# Patient Record
Sex: FEMALE | Race: WHITE | Employment: FULL TIME | URBAN - METROPOLITAN AREA
[De-identification: names, ages, dates, MRNs, and addresses within clinical notes are randomized per-mention and may not be internally consistent; named-entity substitution may affect disease eponyms.]

---

## 2017-04-28 ENCOUNTER — OFFICE VISIT (OUTPATIENT)
Dept: FAMILY MEDICINE CLINIC | Facility: CLINIC | Age: 42
End: 2017-04-28

## 2017-04-28 VITALS
DIASTOLIC BLOOD PRESSURE: 76 MMHG | HEART RATE: 67 BPM | TEMPERATURE: 98 F | BODY MASS INDEX: 35.72 KG/M2 | WEIGHT: 217 LBS | HEIGHT: 65.5 IN | SYSTOLIC BLOOD PRESSURE: 100 MMHG | OXYGEN SATURATION: 98 %

## 2017-04-28 DIAGNOSIS — M25.561 CHRONIC PAIN OF RIGHT KNEE: ICD-10-CM

## 2017-04-28 DIAGNOSIS — M21.6X1 PRONATION DEFORMITY OF RIGHT FOOT: ICD-10-CM

## 2017-04-28 DIAGNOSIS — M79.604 RIGHT LEG PAIN: ICD-10-CM

## 2017-04-28 DIAGNOSIS — M79.671 RIGHT FOOT PAIN: ICD-10-CM

## 2017-04-28 DIAGNOSIS — M25.571 CHRONIC PAIN OF RIGHT ANKLE: ICD-10-CM

## 2017-04-28 DIAGNOSIS — M25.551 RIGHT HIP PAIN: Primary | ICD-10-CM

## 2017-04-28 DIAGNOSIS — G89.29 CHRONIC PAIN OF RIGHT ANKLE: ICD-10-CM

## 2017-04-28 DIAGNOSIS — G89.29 CHRONIC PAIN OF RIGHT KNEE: ICD-10-CM

## 2017-04-28 PROCEDURE — 80050 GENERAL HEALTH PANEL: CPT | Performed by: FAMILY MEDICINE

## 2017-04-28 PROCEDURE — 85652 RBC SED RATE AUTOMATED: CPT | Performed by: FAMILY MEDICINE

## 2017-04-28 PROCEDURE — 36415 COLL VENOUS BLD VENIPUNCTURE: CPT | Performed by: FAMILY MEDICINE

## 2017-04-28 PROCEDURE — 86200 CCP ANTIBODY: CPT | Performed by: FAMILY MEDICINE

## 2017-04-28 PROCEDURE — 86140 C-REACTIVE PROTEIN: CPT | Performed by: FAMILY MEDICINE

## 2017-04-28 PROCEDURE — 99214 OFFICE O/P EST MOD 30 MIN: CPT | Performed by: FAMILY MEDICINE

## 2017-04-28 PROCEDURE — 86038 ANTINUCLEAR ANTIBODIES: CPT | Performed by: FAMILY MEDICINE

## 2017-05-01 ENCOUNTER — TELEPHONE (OUTPATIENT)
Dept: FAMILY MEDICINE CLINIC | Facility: CLINIC | Age: 42
End: 2017-05-01

## 2017-05-01 NOTE — TELEPHONE ENCOUNTER
Notes Recorded by Dion Smith MD on 4/30/2017 at 10:56 PM  Please notify pt her labs look great, including normal blood counts, blood sugar, kidneys, liver, electrolytes, thyroid, and negative autoimmune screen (rheumatoid arthritis, sjogrens, etc).  I'l

## 2017-05-10 ENCOUNTER — TELEPHONE (OUTPATIENT)
Dept: FAMILY MEDICINE CLINIC | Facility: CLINIC | Age: 42
End: 2017-05-10

## 2017-05-10 NOTE — TELEPHONE ENCOUNTER
Called the pt and advised that the MRI has been denied and that Dr. Peña Dealowell is recommending that she see podiatry- the pt v/u and states that she will go see Dr. Anderson Dee.   Actually the pt was contemplating if the MRI was worth the money so she tells me that

## 2017-05-10 NOTE — TELEPHONE ENCOUNTER
MRI of the right ankle has been denied since she has not had specific doctor directed  Therapy in the last 3 months- In addition the insurance requires plain xrays, so Dr. Cathern Hatchet is recommending that you see a podiatrist at this time to help guide treatment

## 2017-07-11 ENCOUNTER — TELEPHONE (OUTPATIENT)
Dept: FAMILY MEDICINE CLINIC | Facility: CLINIC | Age: 42
End: 2017-07-11

## 2017-07-11 NOTE — TELEPHONE ENCOUNTER
Called the pt and advised that Dr. Saran Goss can do the px with the preop in August- she is agreeable and appt for 7/12/17 cancelled  Future Appointments  Date Time Provider Pricila Steele   8/7/2017 8:20 AM Dion Smith MD [facility] JENNA Crowell

## 2017-07-11 NOTE — TELEPHONE ENCOUNTER
Pt is unsure as to if she should keep her px now that we just set up a Pre Op in AUG. Please call pt back.

## 2017-08-07 ENCOUNTER — OFFICE VISIT (OUTPATIENT)
Dept: FAMILY MEDICINE CLINIC | Facility: CLINIC | Age: 42
End: 2017-08-07

## 2017-08-07 VITALS
HEART RATE: 66 BPM | TEMPERATURE: 97 F | BODY MASS INDEX: 35 KG/M2 | SYSTOLIC BLOOD PRESSURE: 104 MMHG | DIASTOLIC BLOOD PRESSURE: 72 MMHG | WEIGHT: 212.38 LBS | RESPIRATION RATE: 14 BRPM

## 2017-08-07 DIAGNOSIS — Z13.0 SCREENING, ANEMIA, DEFICIENCY, IRON: ICD-10-CM

## 2017-08-07 DIAGNOSIS — N84.1 CERVICAL POLYP: ICD-10-CM

## 2017-08-07 DIAGNOSIS — Z13.21 ENCOUNTER FOR VITAMIN DEFICIENCY SCREENING: ICD-10-CM

## 2017-08-07 DIAGNOSIS — Z13.1 DIABETES MELLITUS SCREENING: ICD-10-CM

## 2017-08-07 DIAGNOSIS — Z12.39 SCREENING BREAST EXAMINATION: ICD-10-CM

## 2017-08-07 DIAGNOSIS — Z12.4 CERVICAL CANCER SCREENING: ICD-10-CM

## 2017-08-07 DIAGNOSIS — Z12.31 ENCOUNTER FOR SCREENING MAMMOGRAM FOR BREAST CANCER: ICD-10-CM

## 2017-08-07 DIAGNOSIS — Z01.818 PRE-OP EVALUATION: ICD-10-CM

## 2017-08-07 DIAGNOSIS — Z00.00 ROUTINE HISTORY AND PHYSICAL EXAMINATION OF ADULT: Primary | ICD-10-CM

## 2017-08-07 DIAGNOSIS — M77.8 CAPSULITIS OF FOOT, RIGHT: ICD-10-CM

## 2017-08-07 DIAGNOSIS — Z13.29 THYROID DISORDER SCREEN: ICD-10-CM

## 2017-08-07 DIAGNOSIS — Z13.220 LIPID SCREENING: ICD-10-CM

## 2017-08-07 DIAGNOSIS — M89.8X7 EXOSTOSIS OF BONE OF FOOT: ICD-10-CM

## 2017-08-07 DIAGNOSIS — L50.3 DERMATOGRAPHISM: ICD-10-CM

## 2017-08-07 LAB
25-HYDROXYVITAMIN D (TOTAL): 34.6 NG/ML (ref 30–100)
ALBUMIN SERPL-MCNC: 4 G/DL (ref 3.5–4.8)
ALP LIVER SERPL-CCNC: 78 U/L (ref 37–98)
ALT SERPL-CCNC: 31 U/L (ref 14–54)
APTT PPP: 29.6 SECONDS (ref 25–34)
AST SERPL-CCNC: 19 U/L (ref 15–41)
BASOPHILS # BLD AUTO: 0.06 X10(3) UL (ref 0–0.1)
BASOPHILS NFR BLD AUTO: 1 %
BILIRUB SERPL-MCNC: 0.4 MG/DL (ref 0.1–2)
BUN BLD-MCNC: 10 MG/DL (ref 8–20)
CALCIUM BLD-MCNC: 9.3 MG/DL (ref 8.3–10.3)
CHLORIDE: 108 MMOL/L (ref 101–111)
CHOLEST SMN-MCNC: 146 MG/DL (ref ?–200)
CO2: 26 MMOL/L (ref 22–32)
CREAT BLD-MCNC: 0.75 MG/DL (ref 0.55–1.02)
EOSINOPHIL # BLD AUTO: 0.18 X10(3) UL (ref 0–0.3)
EOSINOPHIL NFR BLD AUTO: 2.9 %
ERYTHROCYTE [DISTWIDTH] IN BLOOD BY AUTOMATED COUNT: 13.3 % (ref 11.5–16)
EST. AVERAGE GLUCOSE BLD GHB EST-MCNC: 111 MG/DL (ref 68–126)
GLUCOSE BLD-MCNC: 92 MG/DL (ref 70–99)
HBA1C MFR BLD HPLC: 5.5 % (ref ?–5.7)
HCT VFR BLD AUTO: 42.1 % (ref 34–50)
HDLC SERPL-MCNC: 41 MG/DL (ref 45–?)
HDLC SERPL: 3.56 {RATIO} (ref ?–4.44)
HGB BLD-MCNC: 13.6 G/DL (ref 12–16)
IMMATURE GRANULOCYTE COUNT: 0.01 X10(3) UL (ref 0–1)
IMMATURE GRANULOCYTE RATIO %: 0.2 %
INR BLD: 1.04 (ref 0.89–1.11)
LDLC SERPL CALC-MCNC: 90 MG/DL (ref ?–130)
LDLC SERPL-MCNC: 15 MG/DL (ref 5–40)
LYMPHOCYTES # BLD AUTO: 2 X10(3) UL (ref 0.9–4)
LYMPHOCYTES NFR BLD AUTO: 31.9 %
M PROTEIN MFR SERPL ELPH: 7.9 G/DL (ref 6.1–8.3)
MCH RBC QN AUTO: 29 PG (ref 27–33.2)
MCHC RBC AUTO-ENTMCNC: 32.3 G/DL (ref 31–37)
MCV RBC AUTO: 89.8 FL (ref 81–100)
MONOCYTES # BLD AUTO: 0.68 X10(3) UL (ref 0.1–0.6)
MONOCYTES NFR BLD AUTO: 10.9 %
NEUTROPHIL ABS PRELIM: 3.33 X10 (3) UL (ref 1.3–6.7)
NEUTROPHILS # BLD AUTO: 3.33 X10(3) UL (ref 1.3–6.7)
NEUTROPHILS NFR BLD AUTO: 53.1 %
NONHDLC SERPL-MCNC: 105 MG/DL (ref ?–130)
PLATELET # BLD AUTO: 253 10(3)UL (ref 150–450)
POTASSIUM SERPL-SCNC: 3.7 MMOL/L (ref 3.6–5.1)
PSA SERPL DL<=0.01 NG/ML-MCNC: 13.6 SECONDS (ref 12–14.3)
RBC # BLD AUTO: 4.69 X10(6)UL (ref 3.8–5.1)
RED CELL DISTRIBUTION WIDTH-SD: 43.8 FL (ref 35.1–46.3)
SODIUM SERPL-SCNC: 139 MMOL/L (ref 136–144)
TRIGLYCERIDES: 73 MG/DL (ref ?–150)
TSI SER-ACNC: 0.47 MIU/ML (ref 0.35–5.5)
WBC # BLD AUTO: 6.3 X10(3) UL (ref 4–13)

## 2017-08-07 PROCEDURE — 85730 THROMBOPLASTIN TIME PARTIAL: CPT | Performed by: FAMILY MEDICINE

## 2017-08-07 PROCEDURE — 83036 HEMOGLOBIN GLYCOSYLATED A1C: CPT | Performed by: FAMILY MEDICINE

## 2017-08-07 PROCEDURE — 80050 GENERAL HEALTH PANEL: CPT | Performed by: FAMILY MEDICINE

## 2017-08-07 PROCEDURE — 99396 PREV VISIT EST AGE 40-64: CPT | Performed by: FAMILY MEDICINE

## 2017-08-07 PROCEDURE — 82306 VITAMIN D 25 HYDROXY: CPT | Performed by: FAMILY MEDICINE

## 2017-08-07 PROCEDURE — 85610 PROTHROMBIN TIME: CPT | Performed by: FAMILY MEDICINE

## 2017-08-07 PROCEDURE — 88175 CYTOPATH C/V AUTO FLUID REDO: CPT | Performed by: FAMILY MEDICINE

## 2017-08-07 PROCEDURE — 36415 COLL VENOUS BLD VENIPUNCTURE: CPT | Performed by: FAMILY MEDICINE

## 2017-08-07 PROCEDURE — 80061 LIPID PANEL: CPT | Performed by: FAMILY MEDICINE

## 2017-08-07 NOTE — PROGRESS NOTES
HPI:   Agustin Looney is a 35year old female who presents for a complete physical exam.  Patient complains of nothing major new for me, has upcoming surgery needs pre-op today (see pre-op).  She does think her muscle fatigue and are sore more quickly th Smokeless tobacco: Never Used                      Alcohol use: No              Occ:  (works from home). : yes. Children: 2.    Exercise: none now  Diet: weight watchers     REVIEW OF SYSTEMS:   GENERAL: feels well in general, are grossly intact    ASSESSMENT AND PLAN:   Generally well appearing female, congratulated on healthy lifestyle changes, keep it up  For disease prevention (dementia, cancer, diabetes, heart disease, depression, anxiety) shoot for 150minutes/week of moder

## 2017-08-07 NOTE — H&P
Preethi Harp is a 35year old female who presents for a pre-operative physical exam. Patient is to have repair of posterior tibial tendon R foot and resection exostosis medfoot right, to be done by Dr. Gena Burdick at [facility] on 8/25/17.       HPI:   Pt °C) (Temporal)   Resp 14   Wt 212 lb 6.4 oz   LMP 07/20/2017   BMI 34.81 kg/m²   GENERAL: well developed, well nourished,in no apparent distress  SKIN: no rashes,no suspicious lesions  HEENT: atraumatic, normocephalic,ear canals clear, normal TMs, nares pa

## 2017-08-08 ENCOUNTER — TELEPHONE (OUTPATIENT)
Dept: FAMILY MEDICINE CLINIC | Facility: CLINIC | Age: 42
End: 2017-08-08

## 2017-08-08 LAB
HPV I/H RISK 1 DNA SPEC QL NAA+PROBE: NEGATIVE
LAST PAP RESULT: NORMAL
PAP HISTORY (OTHER THAN LAST PAP): NORMAL

## 2017-08-08 NOTE — TELEPHONE ENCOUNTER
Spoke with the pt and advised of the lab results and recommendations- she v/u  Advised that I faxed the biometric screening   Faxed the labs and H&P to Dr. Norman Land office

## 2017-08-08 NOTE — TELEPHONE ENCOUNTER
----- Message from Adams Pena MD sent at 8/7/2017 10:51 PM CDT -----  Please notify pt her labs look pretty perfect, including normal labs for surgery (clotting times, blood counts, kidney function, liver function, electrolytes), normal blood sugar, nor

## 2017-08-10 ENCOUNTER — TELEPHONE (OUTPATIENT)
Dept: FAMILY MEDICINE CLINIC | Facility: CLINIC | Age: 42
End: 2017-08-10

## 2017-08-10 NOTE — TELEPHONE ENCOUNTER
----- Message from Tuyet Morton MD sent at 8/8/2017 11:51 PM CDT -----  Please notify pt of good news--her HPV test is negative and her PAP just shows \"reactive squamous cells\"--a fancy phrase for some non-specific inflammation (meaning the cells aren't

## 2017-08-11 ENCOUNTER — MED REC SCAN ONLY (OUTPATIENT)
Dept: FAMILY MEDICINE CLINIC | Facility: CLINIC | Age: 42
End: 2017-08-11

## 2017-08-14 NOTE — TELEPHONE ENCOUNTER
Spoke with the pt and advised of the information from the pap and recommended follow up.   She v/u  She asked if she needs to see the Gyne prior to her surgery-I checked with Dr. Don Humphries and she states that the pt does not need to see Gyne prior to her surgery

## 2017-10-26 ENCOUNTER — TELEPHONE (OUTPATIENT)
Dept: OBGYN CLINIC | Facility: CLINIC | Age: 42
End: 2017-10-26

## 2017-10-26 NOTE — TELEPHONE ENCOUNTER
She will be a new pt referred by Dr. Ja Briscoe. Please review her pap result and let psr know what type of visit that she needs.   Thanks

## 2017-10-27 ENCOUNTER — HOSPITAL ENCOUNTER (OUTPATIENT)
Dept: MAMMOGRAPHY | Age: 42
Discharge: HOME | End: 2017-10-27
Attending: FAMILY MEDICINE
Payer: COMMERCIAL

## 2017-10-27 DIAGNOSIS — Z00.00 ROUTINE HISTORY AND PHYSICAL EXAMINATION OF ADULT: ICD-10-CM

## 2017-10-27 DIAGNOSIS — Z12.31 ENCOUNTER FOR SCREENING MAMMOGRAM FOR BREAST CANCER: ICD-10-CM

## 2017-10-27 PROCEDURE — 77067 SCR MAMMO BI INCL CAD: CPT | Performed by: FAMILY MEDICINE

## 2017-10-30 NOTE — TELEPHONE ENCOUNTER
Her pap is normal  However, the primary care documents a cervical polyp--this should be removed, can usually be done in the office. Ok for new pt appt, when available.

## 2017-10-30 NOTE — TELEPHONE ENCOUNTER
Scheduled PT problem gyne exam in December with Dr. Aurelia Kan in [facility]

## 2017-12-06 ENCOUNTER — OFFICE VISIT (OUTPATIENT)
Dept: OBGYN CLINIC | Facility: CLINIC | Age: 42
End: 2017-12-06

## 2017-12-06 VITALS
SYSTOLIC BLOOD PRESSURE: 110 MMHG | DIASTOLIC BLOOD PRESSURE: 78 MMHG | HEIGHT: 65.75 IN | WEIGHT: 212 LBS | BODY MASS INDEX: 34.48 KG/M2

## 2017-12-06 DIAGNOSIS — N84.1 MUCOUS POLYP OF CERVIX: Primary | ICD-10-CM

## 2017-12-06 PROCEDURE — 57500 BIOPSY OF CERVIX: CPT | Performed by: OBSTETRICS & GYNECOLOGY

## 2017-12-06 PROCEDURE — 88305 TISSUE EXAM BY PATHOLOGIST: CPT | Performed by: OBSTETRICS & GYNECOLOGY

## 2017-12-06 NOTE — PATIENT INSTRUCTIONS
[facility] Department of OB/GYN  After Care Instructions for Colposcopy/Biopsy      Biopsy Results   You will receive a phone call with your biopsy results in 7 business days. If you have not received your biopsy results in 7 days, please contact our office.   Elvin Medina

## 2017-12-06 NOTE — PROGRESS NOTES
Here with cervical polyp    Betadine prep  Polyp removed  Hemostasis intact  Sent to path    alondra well  ebl min

## 2018-08-08 ENCOUNTER — TELEPHONE (OUTPATIENT)
Dept: FAMILY MEDICINE CLINIC | Facility: CLINIC | Age: 43
End: 2018-08-08

## 2018-12-07 ENCOUNTER — HOSPITAL ENCOUNTER (OUTPATIENT)
Dept: MAMMOGRAPHY | Age: 43
Discharge: HOME | End: 2018-12-07
Attending: FAMILY MEDICINE
Payer: COMMERCIAL

## 2018-12-07 ENCOUNTER — HOSPITAL ENCOUNTER (OUTPATIENT)
Dept: GENERAL RADIOLOGY | Age: 43
Discharge: HOME | End: 2018-12-07
Attending: FAMILY MEDICINE
Payer: COMMERCIAL

## 2018-12-07 ENCOUNTER — OFFICE VISIT (OUTPATIENT)
Dept: FAMILY MEDICINE CLINIC | Facility: CLINIC | Age: 43
End: 2018-12-07
Payer: COMMERCIAL

## 2018-12-07 VITALS
RESPIRATION RATE: 14 BRPM | TEMPERATURE: 98 F | HEART RATE: 80 BPM | DIASTOLIC BLOOD PRESSURE: 72 MMHG | HEIGHT: 66.25 IN | SYSTOLIC BLOOD PRESSURE: 112 MMHG | WEIGHT: 225 LBS | BODY MASS INDEX: 36.16 KG/M2

## 2018-12-07 DIAGNOSIS — E55.9 VITAMIN D DEFICIENCY DISEASE: ICD-10-CM

## 2018-12-07 DIAGNOSIS — Z13.21 ENCOUNTER FOR VITAMIN DEFICIENCY SCREENING: ICD-10-CM

## 2018-12-07 DIAGNOSIS — Z13.29 THYROID DISORDER SCREEN: ICD-10-CM

## 2018-12-07 DIAGNOSIS — Z00.00 ROUTINE HISTORY AND PHYSICAL EXAMINATION OF ADULT: Primary | ICD-10-CM

## 2018-12-07 DIAGNOSIS — Z13.1 DIABETES MELLITUS SCREENING: ICD-10-CM

## 2018-12-07 DIAGNOSIS — Z12.4 CERVICAL CANCER SCREENING: ICD-10-CM

## 2018-12-07 DIAGNOSIS — M53.3 COCCYDYNIA: ICD-10-CM

## 2018-12-07 DIAGNOSIS — Z00.00 ROUTINE HISTORY AND PHYSICAL EXAMINATION OF ADULT: ICD-10-CM

## 2018-12-07 DIAGNOSIS — Z13.220 LIPID SCREENING: ICD-10-CM

## 2018-12-07 DIAGNOSIS — Z12.39 SCREENING BREAST EXAMINATION: ICD-10-CM

## 2018-12-07 DIAGNOSIS — Z12.31 SCREENING MAMMOGRAM, ENCOUNTER FOR: ICD-10-CM

## 2018-12-07 DIAGNOSIS — R15.2 FECAL URGENCY: ICD-10-CM

## 2018-12-07 DIAGNOSIS — Z13.0 SCREENING, ANEMIA, DEFICIENCY, IRON: ICD-10-CM

## 2018-12-07 PROCEDURE — 87624 HPV HI-RISK TYP POOLED RSLT: CPT | Performed by: FAMILY MEDICINE

## 2018-12-07 PROCEDURE — 80061 LIPID PANEL: CPT | Performed by: FAMILY MEDICINE

## 2018-12-07 PROCEDURE — 82306 VITAMIN D 25 HYDROXY: CPT | Performed by: FAMILY MEDICINE

## 2018-12-07 PROCEDURE — 99396 PREV VISIT EST AGE 40-64: CPT | Performed by: FAMILY MEDICINE

## 2018-12-07 PROCEDURE — 72220 X-RAY EXAM SACRUM TAILBONE: CPT | Performed by: FAMILY MEDICINE

## 2018-12-07 PROCEDURE — 36415 COLL VENOUS BLD VENIPUNCTURE: CPT | Performed by: FAMILY MEDICINE

## 2018-12-07 PROCEDURE — 80050 GENERAL HEALTH PANEL: CPT | Performed by: FAMILY MEDICINE

## 2018-12-07 PROCEDURE — 77067 SCR MAMMO BI INCL CAD: CPT | Performed by: FAMILY MEDICINE

## 2018-12-07 PROCEDURE — 83036 HEMOGLOBIN GLYCOSYLATED A1C: CPT | Performed by: FAMILY MEDICINE

## 2018-12-07 PROCEDURE — 88175 CYTOPATH C/V AUTO FLUID REDO: CPT | Performed by: FAMILY MEDICINE

## 2018-12-07 PROCEDURE — 87625 HPV TYPES 16 & 18 ONLY: CPT | Performed by: FAMILY MEDICINE

## 2018-12-07 NOTE — PROGRESS NOTES
HPI:   Sal Jackson is a 38year old female who presents for a complete physical exam.      Patient complains of tailbone pain since august, no injury. Sits all day at work.       Has some stool urgency, no change in diet, been more noticeable in the Date   • Gestational diabetes     during 1 pregnancy      Past Surgical History:   Procedure Laterality Date   • D & C      x3 for miscarriage   •       x2   • OTHER SURGICAL HISTORY Right 2017    Foot tendon   • OTHER SURGICAL HISTORY  2017 36.04 kg/m².    GENERAL: well developed, well nourished,in no apparent distress  SKIN: no rashes,no suspicious lesions  HEENT: atraumatic, normocephalic,ears and throat are clear  EYES:PERRLA, EOMI, conjunctiva are clear  NECK: supple,no adenopathy,no thyro mellitus screening    - VENIPUNCTURE  - COMP METABOLIC PANEL (14); Future  - HEMOGLOBIN A1C; Future    6. Lipid screening    - VENIPUNCTURE  - LIPID PANEL; Future    7. Thyroid disorder screen  - VENIPUNCTURE  - ASSAY, THYROID STIM HORMONE; Future    8.  En

## 2018-12-09 NOTE — PROGRESS NOTES
Please notify patient  labs look good overall, including blood counts, kidneys, liver, electrolytes, thyroid, choltserol. Blood sugar has gone up, though, just braely in the prediabetic range now.   The lifestyle changes she plans on making (as we discussed

## 2018-12-10 ENCOUNTER — TELEPHONE (OUTPATIENT)
Dept: FAMILY MEDICINE CLINIC | Facility: CLINIC | Age: 43
End: 2018-12-10

## 2018-12-10 DIAGNOSIS — R73.01 ELEVATED FASTING GLUCOSE: Primary | ICD-10-CM

## 2018-12-10 RX ORDER — MAG HYDROX/ALUMINUM HYD/SIMETH 400-400-40
SUSPENSION, ORAL (FINAL DOSE FORM) ORAL
Qty: 30 CAPSULE | Refills: 0 | COMMUNITY
Start: 2018-12-10 | End: 2019-08-26

## 2018-12-10 NOTE — TELEPHONE ENCOUNTER
----- Message from Carter Lion MD sent at 12/9/2018  3:29 PM CST -----  Please notifyf xray comletely normal.  I'd advice sitting on donut pillow, taking a break every 60minutes at the very least to walk around a few minutes, and if not improving in next

## 2018-12-10 NOTE — TELEPHONE ENCOUNTER
----- Message from Marguerite Britt MD sent at 12/9/2018  3:18 PM CST -----  Please notify patient  labs look good overall, including blood counts, kidneys, liver, electrolytes, thyroid, choltserol.  Blood sugar has gone up, though, just braely in the prediabe

## 2018-12-11 ENCOUNTER — TELEPHONE (OUTPATIENT)
Dept: FAMILY MEDICINE CLINIC | Facility: CLINIC | Age: 43
End: 2018-12-11

## 2018-12-11 NOTE — TELEPHONE ENCOUNTER
Patient advised. Verbalizes understanding. Appointment scheduled.    Future Appointments   Date Time Provider Pricila Steele   12/14/2018  8:30 AM Jackie Lee MD [facility]     Patient states is going shopping for the day after her appoin

## 2018-12-11 NOTE — TELEPHONE ENCOUNTER
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     HPV is very very common, up to 80% of people have it at some point in their life

## 2018-12-14 ENCOUNTER — OFFICE VISIT (OUTPATIENT)
Dept: FAMILY MEDICINE CLINIC | Facility: CLINIC | Age: 43
End: 2018-12-14
Payer: COMMERCIAL

## 2018-12-14 VITALS
WEIGHT: 225 LBS | TEMPERATURE: 98 F | SYSTOLIC BLOOD PRESSURE: 130 MMHG | RESPIRATION RATE: 16 BRPM | HEART RATE: 76 BPM | DIASTOLIC BLOOD PRESSURE: 90 MMHG | BODY MASS INDEX: 36 KG/M2

## 2018-12-14 DIAGNOSIS — R87.810 CERVICAL HIGH RISK HPV (HUMAN PAPILLOMAVIRUS) TEST POSITIVE: ICD-10-CM

## 2018-12-14 DIAGNOSIS — R87.612 LGSIL ON PAP SMEAR OF CERVIX: Primary | ICD-10-CM

## 2018-12-14 DIAGNOSIS — Z01.818 PREPROCEDURAL EXAMINATION: ICD-10-CM

## 2018-12-14 LAB
CONTROL LINE PRESENT WITH A CLEAR BACKGROUND (YES/NO): YES YES/NO
PREGNANCY TEST, URINE: NEGATIVE

## 2018-12-14 PROCEDURE — 81025 URINE PREGNANCY TEST: CPT | Performed by: FAMILY MEDICINE

## 2018-12-14 PROCEDURE — 57452 EXAM OF CERVIX W/SCOPE: CPT | Performed by: FAMILY MEDICINE

## 2018-12-14 NOTE — PROGRESS NOTES
Chad Barnett is a 38year old female. HPI:   Pt is here for a colposcopy.      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

## 2019-01-09 ENCOUNTER — PATIENT OUTREACH (OUTPATIENT)
Dept: FAMILY MEDICINE CLINIC | Facility: CLINIC | Age: 44
End: 2019-01-09

## 2019-07-16 ENCOUNTER — OFFICE VISIT (OUTPATIENT)
Dept: FAMILY MEDICINE CLINIC | Facility: CLINIC | Age: 44
End: 2019-07-16
Payer: COMMERCIAL

## 2019-07-16 VITALS
DIASTOLIC BLOOD PRESSURE: 80 MMHG | SYSTOLIC BLOOD PRESSURE: 104 MMHG | HEIGHT: 66 IN | HEART RATE: 76 BPM | RESPIRATION RATE: 12 BRPM | BODY MASS INDEX: 36.45 KG/M2 | TEMPERATURE: 98 F | WEIGHT: 226.81 LBS

## 2019-07-16 DIAGNOSIS — N92.0 MENORRHAGIA WITH REGULAR CYCLE: ICD-10-CM

## 2019-07-16 DIAGNOSIS — R87.612 LGSIL ON PAP SMEAR OF CERVIX: Primary | ICD-10-CM

## 2019-07-16 DIAGNOSIS — Z02.89 ENCOUNTER FOR COMPLETION OF FORM WITH PATIENT: ICD-10-CM

## 2019-07-16 PROCEDURE — 87624 HPV HI-RISK TYP POOLED RSLT: CPT | Performed by: FAMILY MEDICINE

## 2019-07-16 PROCEDURE — 88175 CYTOPATH C/V AUTO FLUID REDO: CPT | Performed by: FAMILY MEDICINE

## 2019-07-16 PROCEDURE — 87625 HPV TYPES 16 & 18 ONLY: CPT | Performed by: FAMILY MEDICINE

## 2019-07-16 PROCEDURE — 99214 OFFICE O/P EST MOD 30 MIN: CPT | Performed by: FAMILY MEDICINE

## 2019-07-16 NOTE — PROGRESS NOTES
Cody Solorzano is a 37year old female. HPI:   Patient here for f/u PAP since the last one showed LGsIl and colpo was unsatisfactory I had her come back for 6m onths f/u. She did have benign cervial polyp removed 12/2017 with Dr. Christopher Patel.     She al distress  SKIN: no rashes,no suspicious lesions or perineum  LUNGS: normal resp effort  CARDIO: wel perfused  : normal external perineum; speculum exam reveals cervix difficult to visualize os with 12oclock part of cervix protruding furhter forward and o

## 2019-07-17 LAB — HPV I/H RISK 1 DNA SPEC QL NAA+PROBE: POSITIVE

## 2019-07-18 LAB
HPV16 DNA CVX QL PROBE+SIG AMP: NEGATIVE
HPV18 DNA CVX QL PROBE+SIG AMP: NEGATIVE

## 2019-07-20 ENCOUNTER — TELEPHONE (OUTPATIENT)
Dept: FAMILY MEDICINE CLINIC | Facility: CLINIC | Age: 44
End: 2019-07-20

## 2019-07-20 NOTE — TELEPHONE ENCOUNTER
Notes recorded by Andrew Brown MD on 7/20/2019 at 8:46 AM CDT  Please notify patient PAP looks exactly the same as it did last time, mild abnormality, HPV +.  I'd like her to meet with gyne this time for colpo given my difficulty in viewing her entire cer

## 2019-07-22 ENCOUNTER — TELEPHONE (OUTPATIENT)
Dept: OBGYN CLINIC | Facility: CLINIC | Age: 44
End: 2019-07-22

## 2019-07-22 NOTE — TELEPHONE ENCOUNTER
Patient was referred with her pcp, Dr. Connor George for abnormal pap. You had seen pt one time on 12/6/2017. Please review result and let PSR know what type of visit pt needs.  Thanks

## 2019-07-22 NOTE — TELEPHONE ENCOUNTER
Spoke with patient. Provided recommendations and explained rational and procedure. Questions answered and patient states understanding. Encouraged patient to call back if any questions between now and her appt.   Call transferred to [facility] staff to assist with

## 2019-08-26 ENCOUNTER — OFFICE VISIT (OUTPATIENT)
Dept: OBGYN CLINIC | Facility: CLINIC | Age: 44
End: 2019-08-26
Payer: COMMERCIAL

## 2019-08-26 VITALS
HEIGHT: 66 IN | WEIGHT: 226 LBS | DIASTOLIC BLOOD PRESSURE: 76 MMHG | SYSTOLIC BLOOD PRESSURE: 120 MMHG | BODY MASS INDEX: 36.32 KG/M2

## 2019-08-26 DIAGNOSIS — R87.612 PAPANICOLAOU SMEAR OF CERVIX WITH LOW GRADE SQUAMOUS INTRAEPITHELIAL LESION (LGSIL): Primary | ICD-10-CM

## 2019-08-26 DIAGNOSIS — Z01.812 PRE-PROCEDURAL LABORATORY EXAMINATION: ICD-10-CM

## 2019-08-26 LAB — CONTROL LINE PRESENT WITH A CLEAR BACKGROUND (YES/NO): YES YES/NO

## 2019-08-26 PROCEDURE — 57454 BX/CURETT OF CERVIX W/SCOPE: CPT | Performed by: OBSTETRICS & GYNECOLOGY

## 2019-08-26 PROCEDURE — 81025 URINE PREGNANCY TEST: CPT | Performed by: OBSTETRICS & GYNECOLOGY

## 2019-08-26 NOTE — PROCEDURES
Colposcopy Procedure Note    Date of Procedure: 08/26/19    Indications: 37year old y/o female with LSIL and HPV (neg HR)     Pre-procedure diagnosis:   LSIL    Post-procedure diagnosis:  same    Procedure:  Colposcopy   Cervical Biopsy   ECC     Procedur EMG - AGUSN

## 2019-09-02 NOTE — PROGRESS NOTES
Reviewed result.  REBEKA I (low grade dysplasia) on one of the biopsies (12 o'clock)  Pt needs repeat PAP and HPV in 12 months

## 2020-09-18 ENCOUNTER — TELEPHONE (OUTPATIENT)
Dept: OBGYN CLINIC | Facility: CLINIC | Age: 45
End: 2020-09-18

## 2020-09-25 NOTE — TELEPHONE ENCOUNTER
Attempted pt again. Cell number just rings no voicemail, home number not able to leave message, she is not on mychart either.   Please mail pt a letter

## 2020-09-25 NOTE — TELEPHONE ENCOUNTER
Patient called, she does not want to schedule an annual px in our office. Pt said, her pcp always does it also, she said she will talk to her pcp if she really want her to see ob/gyn and if so,  she will just call back if needed.

## 2020-09-28 NOTE — PROGRESS NOTES
Contacted patient regarding overdue repeat pap. Per PSR staff, patient desires to complete with her PCP.

## (undated) NOTE — LETTER
01/09/19        [address] [place] [id number]      Dear Krista Sandy,    [facility] records indicate that you have outstanding lab work and or testing that was ordered for you and has not yet been completed:  Lab Frequency Next Occurrence   HEMO

## (undated) NOTE — MR AVS SNAPSHOT
[address] [address]  [phone]               Thank you for choosing us for your health care visit with Adams Pena MD.  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    Eat at home when possible     Tips for increasing your physical activ

## (undated) NOTE — LETTER
Verena Clark, :1975    CONSENT FOR PROCEDURE/SEDATION    1. I authorize the performance upon Won Shade  the following: Colposcopy with biopsy and Endocervical curettage    2.  I authorize Dr. Marylene Bidding, MD (and whomever is designated Relationship to patient: ____________________________________________    Witness: _________________________________________ Date:___________     Physician Signature: _______________________________ Date:___________

## (undated) NOTE — LETTER
Verena Clark, :1975    CONSENT FOR PROCEDURE/SEDATION    1. I authorize the performance upon Marcela Monroe  the following: Polypectomy    2.  I authorize Dr. Emerita Alvarez MD (and whomever is designated as the doctor’s assistant), to Witness: _________________________________________ Date:___________     Physician Signature: _______________________________ Date:___________

## (undated) NOTE — LETTER
[address] [facility]    8/8/2018      Dear  Сергей Beltran    In order to provide the highest quality care, [facility] uses a sophisticated computer system to track our patient's recor